# Patient Record
(demographics unavailable — no encounter records)

---

## 2025-06-28 NOTE — PHYSICAL EXAM
[Mucoid Discharge] : mucoid discharge [Inflamed Nasal Mucosa] : inflamed nasal mucosa [Erythematous Oropharynx] : erythematous oropharynx [Enlarged Tonsils] : enlarged tonsils [Exudate] : no exudate [NL] : warm, clear

## 2025-06-28 NOTE — HISTORY OF PRESENT ILLNESS
[EENT/Resp Symptoms] : EENT/RESPIRATORY SYMPTOMS [Runny nose] : runny nose [Nasal congestion] : nasal congestion [Sick Contacts: ___] : sick contacts: [unfilled] [Ibuprofen] : ibuprofen [Last dose: _____] : last dose: [unfilled] [Fever] : fever [Nasal Congestion] : nasal congestion [Max Temp: ____] : Max temperature: [unfilled] [___ Week(s)] : [unfilled] week(s) [Intermittent] : intermittent [Decreased appetite] : decreased appetite [Known Exposure to COVID-19] : no known exposure to COVID-19 [Hx of recent COVID-19 infection] : no history of recent COVID-19 infection [Mucoid discharge] : mucoid discharge [Rhinorrhea] : rhinorrhea [Cough] : no cough [Decreased Appetite] : no decreased appetite [de-identified] : Pt has a fever since yesterday but has been congested x 1 week

## 2025-06-28 NOTE — CARE PLAN
[Care Plan reviewed and provided to patient/caregiver] : Care plan reviewed and provided to patient/caregiver [Understands and communicates without difficulty] : Patient/Caregiver understands and communicates without difficulty [FreeTextEntry3] :    Sinus pain with a cold is common. It gets better on its own. If symptoms last for 10 days or more, then a bacterial sinus infection becomes more likely. Bacterial sinus infections are treated with antibiotics and symptoms usually begin to improve within 2 to 3 days.  Other supportive care you can offer your child includes:  for the Sinus pain may useTylenol or ibuprofen Warm compresses over the sinuses as often as your child likes  Nasal congestion Drink plenty of fluids to thin the congestion Use saline (salt water) nose drops or spray to loosen up the dried mucus. If you dont have saline, you can use a few drops of bottled water or clean tap water. Run a hot shower to create a steam-filled bathroom where your child can sit to help clear stuffiness For older children, saline sinus irrigation can provide better relief of congestion. Cool-mist humidifier. Make sure to clean daily to avoid bacteria and mold growth.   To come back if: Your child develops a worsening headache especially if its in one specific area Swelling redness around the eyes Sensitivity to light Your child is not drinking well Your child develops unusual lethargy/tiredness Your anand symptoms dont improve or worsen after 48-72 hours on antibiotics    return to office in 2 weeks for follow up